# Patient Record
Sex: MALE | Race: OTHER | HISPANIC OR LATINO | URBAN - METROPOLITAN AREA
[De-identification: names, ages, dates, MRNs, and addresses within clinical notes are randomized per-mention and may not be internally consistent; named-entity substitution may affect disease eponyms.]

---

## 2024-03-05 ENCOUNTER — EMERGENCY (EMERGENCY)
Facility: HOSPITAL | Age: 24
LOS: 1 days | Discharge: HOME | End: 2024-03-05
Admitting: EMERGENCY MEDICINE
Payer: COMMERCIAL

## 2024-03-05 VITALS
HEART RATE: 97 BPM | SYSTOLIC BLOOD PRESSURE: 138 MMHG | RESPIRATION RATE: 18 BRPM | TEMPERATURE: 97 F | OXYGEN SATURATION: 97 % | DIASTOLIC BLOOD PRESSURE: 84 MMHG

## 2024-03-05 VITALS
RESPIRATION RATE: 18 BRPM | HEART RATE: 102 BPM | OXYGEN SATURATION: 98 % | DIASTOLIC BLOOD PRESSURE: 83 MMHG | WEIGHT: 250 LBS | SYSTOLIC BLOOD PRESSURE: 142 MMHG | TEMPERATURE: 100 F

## 2024-03-05 PROCEDURE — 73564 X-RAY EXAM KNEE 4 OR MORE: CPT | Mod: 26,RT

## 2024-03-05 PROCEDURE — 99284 EMERGENCY DEPT VISIT MOD MDM: CPT

## 2024-03-05 PROCEDURE — 99284 EMERGENCY DEPT VISIT MOD MDM: CPT | Mod: 57

## 2024-03-05 PROCEDURE — 73564 X-RAY EXAM KNEE 4 OR MORE: CPT

## 2024-03-05 PROCEDURE — 99284 EMERGENCY DEPT VISIT MOD MDM: CPT | Mod: 25

## 2024-03-05 PROCEDURE — 73700 CT LOWER EXTREMITY W/O DYE: CPT | Mod: 26,RT,MC

## 2024-03-05 PROCEDURE — 27530 TREAT KNEE FRACTURE: CPT

## 2024-03-05 PROCEDURE — 73700 CT LOWER EXTREMITY W/O DYE: CPT | Mod: MC

## 2024-03-05 PROCEDURE — 27508 TREATMENT OF THIGH FRACTURE: CPT

## 2024-03-05 RX ORDER — OXYCODONE AND ACETAMINOPHEN 5; 325 MG/1; MG/1
1 TABLET ORAL
Qty: 15 | Refills: 0
Start: 2024-03-05

## 2024-03-05 NOTE — ED PROVIDER NOTE - PATIENT PORTAL LINK FT
You can access the FollowMyHealth Patient Portal offered by [facility] by registering at the following website: [email or web address]. By joining [employer]’s FollowMyHealth portal, you will also be able to view your health information using other applications (apps) compatible with our system.

## 2024-03-05 NOTE — ED ADULT NURSE NOTE - CHIEF COMPLAINT QUOTE
pt presents to ER c/o R knee pain and swelling after sustaining a trip and fall yesterday. denies head strike. no obvious deformity noted. straight cane

## 2024-03-05 NOTE — CONSULT NOTE ADULT - SUBJECTIVE AND OBJECTIVE BOX
Orthopaedic Surgery Consult Note    For Surgeon: Dr. Hahn    HPI:  23yMale  Patient is a 23y old  Male who presents with a chief complaint of right knee pain s/p trip and fall yesterday    HPI:  22 y/o male, no significant PMHx, presents to ED with R knee pain s/p mechanical trip and fall onto right knee on concrete yesterday. Patient felt sudden onset of severe pain, ?popping sensation at time of fall. Was able to get up, went home and took tylenol however no relief of pain overnight. Unable to bear weight today so came into ER for further eval. Denies injury to other extremities. Denies injury to foot/ankle. Denies associated numbness/tingling. Orthopedics consulted for CT findings in ED.     Allergies    No Known Allergies    Intolerances    PAST MEDICAL & SURGICAL HISTORY:  No pertinent past medical history      MEDICATIONS  (STANDING):    MEDICATIONS  (PRN):      Vital Signs Last 24 Hrs  T(C): 36.2 (05 Mar 2024 14:50), Max: 37.5 (05 Mar 2024 11:01)  T(F): 97.2 (05 Mar 2024 14:50), Max: 99.5 (05 Mar 2024 11:01)  HR: 97 (05 Mar 2024 14:50) (97 - 102)  BP: 138/84 (05 Mar 2024 14:50) (138/84 - 142/83)  BP(mean): --  RR: 18 (05 Mar 2024 14:50) (18 - 18)  SpO2: 97% (05 Mar 2024 14:50) (97% - 98%)    Parameters below as of 05 Mar 2024 14:50  Patient On (Oxygen Delivery Method): room air      Physical Exam:    VS: stable, reviewed per nursing documentation  General: No acute distress, resting comfortably  Neuro: Alert, oriented x 3  Psych: Normal mood & affect  HEENT: normocephalic, atraumatic   Neck: trachea midline  Respiratory: nonlabored on room air   CV: no cyanosis, no peripheral edema   GI: nondistended   Skin: + superficial abrasion anterior inferior knee, no laceration, no active bleeding or weeping from abrasion   MSK: atraumatic with exception of below  RLE     -Inspection/palpation: + swelling of the right knee without obvious deformity, no erythema, no ecchymosis, superficial abrasion as above     + tenderness over the medial femoral condyle, posterior knee, lateral joint line over the LCL, proximal fibula     -Compartments: soft, nontender bilaterally     -ROM: limited AROM secondary to pain of the right knee     -Quadriceps/TA/GS/EHL/FHL 5/5 bilateral lower extremities     -Sensation:intact to light touch bilateral lower extremities    -Pulses:  2+ DP pulse symmetric, extremities warm and well perfused, capillary refill brisk        Imaging:     ACC: [id number] EXAM:  XR KNEE COMP 4+ VIEWS RT   ORDERED BY: LUKASZ CONLEY     PROCEDURE DATE:  03/05/2024      IMPRESSION:    No acute fracture or dislocation is demonstrated.    No advanced arthritic changes.    Moderate-sized joint effusion.    --- End of Report ---    ACC: [id number] EXAM:  CT KNEE ONLY RT   ORDERED BY: LUKASZ CONLEY     PROCEDURE DATE:  03/05/2024      INTERPRETATION:  CT OF THE RIGHT KNEE    TECHNIQUE: Axial CT images were obtained of the right knee with coronal   and sagittal reconstructions. No intravenous contrast was administered.    FINDINGS:    Osseous: Acute nondisplaced, minimally to mildly impacted osteochondral   fractures along the anterior peripheral weightbearing lateral femoral   condyle and posterior lateral tibial plateau. Small minimally displaced   cortical avulsion fracture at the femoral condylar origin of the   superficial band of the medial collateral ligament. No Segond or arcuate   cortical avulsion fragments. No dislocation. Bony mineralization within   normal limits.    Soft tissues: Large volume lipohemarthrosis. No distended Baker's   popliteal cyst, sizable hyperattenuating hematoma, drainable encapsulated   fluid collection. Small chronic grade 2 strain of the distal vastus   lateralis. Muscle bulk and attenuation are otherwise within normal limits.    IMPRESSION:    Acute nondisplaced right knee pivot shift injury including small   minimally displaced Stieda Cassy type medial femoral condylar   cortical avulsion fracture and large volume lipohemarthrosis. Correlate   for anterior instability and recommend follow-up outpatient MRI.    --- End of Report ---        A/P: 23yMale with right knee injury significant for medial femoral condyle nondisplaced avulsion fx, impacted posterior lateral tibial plateau fx, lipohemoarthrosis, c/f possible ACL tear     - Patient seen and examined in the ED  - XR and CT imaging reviewed  - Recommend NWB Right knee in Knee immobilizer, crutches for assistance  - Ice, elevation, analgesics   - stable for d/c from ED, outpatient f/u with Dr Andreina mclaughlinorrow at [facility] [phone], vs resident     -Discussed with Dr. Hdz     Ortho Pager [phone]

## 2024-03-05 NOTE — ED ADULT NURSE NOTE - NSFALLUNIVINTERV_ED_ALL_ED
Bed/Stretcher in lowest position, wheels locked, appropriate side rails in place/Call bell, personal items and telephone in reach/Instruct patient to call for assistance before getting out of bed/chair/stretcher/Non-slip footwear applied when patient is off stretcher/[place] to call system/Physically safe environment - no spills, clutter or unnecessary equipment/Purposeful proactive rounding/Room/bathroom lighting operational, light cord in reach

## 2024-03-05 NOTE — ED ADULT TRIAGE NOTE - CHIEF COMPLAINT QUOTE
pt presents to ER c/o R knee pain and swelling after sustaining a trip and fall yesterday. denies head strike. no obvious deformity noted.

## 2024-03-05 NOTE — ED ADULT NURSE NOTE - OBJECTIVE STATEMENT
23yoM no PMH BIBEMS c/o R knee pain s/p slip and falling last night. Pt states  he was wearing his sandals when he slipped. Abrasion and swelling noted on knee. No active bleeding. Pt states he took two tylenol last night for pain but pain increased today. Ice packs applied. Pt denies LOC, blood thinner use, head strike, chest pain, and SOB.
Health Care Proxy (HCP)

## 2024-03-05 NOTE — ED PROVIDER NOTE - CARE PROVIDER_API CALL
Dale Hdz  Orthopaedic Surgery  [address]  [place] [address]  Phone: [phone]  Fax: [phone]  Follow Up Time:

## 2024-03-05 NOTE — ED PROVIDER NOTE - NSFOLLOWUPINSTRUCTIONS_ED_ALL_ED_FT
Take tylenol 650mg or motrin 400-800mg as needed every 4-6 hours for pain.   Take percocet for severe pain   REST- Rest your hurting/injured joint or extremity to decrease pain and swelling for 24-48 hours    ICE- Apply ice to area of pain to decreased inflammation and pain, put towel/barrier between ice and skin. You can keep ice on for 20 minutes at a time 4-8 times daily   COMPRESSION- Wear ace wrap or brace for support to reduce swelling.  Make sure not to wrap too tight, loosen if skin feeling numb/tingling or skin turns blue   ELEVATION- Elevate hurting/injured area 6 or more inches about level of heart to decrease swelling/inflammation.  Use pillow under joint to elevate area    Call to arrange appointment with Dr. Hdz for tomorrow    You may call our referrals coordinator at [phone] Monday to Friday 11am-7pm for assistance with making an appointment    How to Use a Knee Immobilizer  A brace on a person's knee.  A knee immobilizer is a device used to keep your knee from moving or bending while it is healing. It also supports and protects your knee.    This device is also called a knee brace. You may need a knee brace if:  Your knee is injured or painful.  You had knee surgery.  Wear and take off your knee brace only as told by your doctor.    What are the risks?  Knee braces are usually safe to wear. But problems may happen, such as:  Irritated skin. In rare cases, this may lead to an infection.  Making your knee problem worse. This could happen if you wear your brace in the wrong way.  How to use a knee brace  Your doctor will show you or tell you:  How to put on your knee brace.  How to adjust your brace.  When and how often to wear your brace.  How to take off your brace.  If you will also need to use crutches or a cane.  Follow these instructions at home:  Bathing    If the brace is not waterproof:  Do not let it get wet.  Cover it with a watertight covering when you take a bath or shower.  If your doctor says that you may take off the brace:  Take it off before you take a bath or shower.  Check for any skin irritation.  Use a towel to dry the area fully before you put the brace back on.  Managing pain, stiffness, and swelling    While resting, raise your leg above the level of your heart. You can use pillows for support. Doing this lessens throbbing and swelling. It also helps with healing.  Loosen the brace if you have symptoms or signs that it is too tight, such as:  Swelling.  Tingling in your toes.  Loss of feeling (numbness).  Color change on your foot or ankle.  More pain.  Infection signs    Check any irritations, cuts from surgery (incisions), or cuts on your skin under the brace for signs of infection. Do this every day. Check for:  More redness, swelling, or pain.  Fluid or blood.  Warmth.  Pus or a bad smell.  General instructions    Keep the brace clean and dry.  Return to your normal activities when your doctor says that it is safe.  Check the skin around the brace every day. Tell your doctor if you have any concerns.  Follow your doctor's instructions about whether you can put any weight on your injured leg. Use crutches or a cane as told.  Keep all follow-up visits.  Contact a doctor if:  Your knee brace breaks or needs to be replaced.  You have more pain or swelling in your knee, foot, or ankle.  Your knee brace is not helping.  Your knee brace makes your knee pain worse.  You have any signs of infection of the skin under your knee brace.  Summary  A knee immobilizer, also called a knee brace, is used to support and protect your knee.  A knee immobilizer keeps your knee from moving or bending while it is healing.  .You may need this brace if you injured your knee, you have knee pain, or you had knee surgery.  Your doctor will show you or tell you how to use your knee brace.  Call your doctor if you have more pain or swelling, if your knee brace breaks, or if it does not help your knee pain.  This information is not intended to replace advice given to you by your health care provider. Make sure you discuss any questions you have with your health care provider.

## 2024-03-05 NOTE — ED PROVIDER NOTE - CLINICAL SUMMARY MEDICAL DECISION MAKING FREE TEXT BOX
23 M denies pmh p/w R knee pain s/p injury.  on exam RLE: superficial abrasion over R knee, pain limited ROM knee, + suprapatellar effusion, + ttp over tibial plateau, FROM ankle/hip, DP/PT Pulse 2+, SILT, cap refill < 2 sec, no calf ttp, achilles intact.  likely contusion/sprain but r/o fx as cannot bear wt.  will obtain xray and give percocet for pain

## 2024-03-05 NOTE — ED PROVIDER NOTE - PHYSICAL EXAMINATION
Gen: well appearing, no acute distress  Skin: warm/dry, no rash noted  Resp: breathing comfortably, speaking in full sentences, no dyspnea  RLE: superficial abrasion over R knee, pain limited ROM knee, + suprapatellar effusion, + ttp over tibial plateau, FROM ankle/hip, DP/PT Pulse 2+, SILT, cap refill < 2 sec, no calf ttp, achilles intact  Neuro: alert/orientedx3, difficulty bearing wt on RLE

## 2024-03-05 NOTE — ED PROVIDER NOTE - OBJECTIVE STATEMENT
23 M denies pmh p/w R knee pain s/p injury.  pt reports slip and fall yesterday landing directly on R knee, has not been able to bear wt since.  took advil last night w/ minimal relief.  reports this morning still difficulty walking so came to ED.  denies f/c, numbness/weakness, paresthesias, ankle/hip pain, calf pain or other injuries

## 2024-03-05 NOTE — ED PROVIDER NOTE - PROGRESS NOTE DETAILS
pt unable to bear wt, will obtain ct knee as xray neg for fx ct shows Acute nondisplaced right knee pivot shift injury including small minimally displaced Stieda Cassy type medial femoral condylar cortical avulsion fracture and large volume lipohemarthrosis.  ortho cnsulted, placed in knee immobilizer and crutches for NWB and will f/u tomorrow w/ Dr. Hdz.  educated no RICE and discussed strict return parameters

## 2024-03-08 DIAGNOSIS — S72.434A: ICD-10-CM
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
## 2024-03-08 DIAGNOSIS — M17.11 UNILATERAL PRIMARY OSTEOARTHRITIS, RIGHT KNEE: ICD-10-CM

## 2024-03-08 DIAGNOSIS — S80.211A ABRASION, RIGHT KNEE, INITIAL ENCOUNTER: ICD-10-CM

## 2024-03-08 DIAGNOSIS — M25.561 PAIN IN RIGHT KNEE: ICD-10-CM
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
## 2024-03-08 DIAGNOSIS — R26.2 DIFFICULTY IN WALKING, NOT ELSEWHERE CLASSIFIED: ICD-10-CM
